# Patient Record
Sex: FEMALE | Race: WHITE | NOT HISPANIC OR LATINO | Employment: STUDENT | URBAN - METROPOLITAN AREA
[De-identification: names, ages, dates, MRNs, and addresses within clinical notes are randomized per-mention and may not be internally consistent; named-entity substitution may affect disease eponyms.]

---

## 2017-01-05 ENCOUNTER — TRANSCRIBE ORDERS (OUTPATIENT)
Dept: NUTRITION | Facility: HOSPITAL | Age: 22
End: 2017-01-05

## 2017-01-05 DIAGNOSIS — R19.7 DIARRHEA, UNSPECIFIED TYPE: Primary | ICD-10-CM

## 2017-01-11 ENCOUNTER — OFFICE VISIT (OUTPATIENT)
Dept: NUTRITION | Facility: HOSPITAL | Age: 22
End: 2017-01-11

## 2017-01-11 VITALS — HEIGHT: 63 IN | WEIGHT: 180 LBS | BODY MASS INDEX: 31.89 KG/M2

## 2017-01-11 PROCEDURE — 97802 MEDICAL NUTRITION INDIV IN: CPT

## 2017-01-11 NOTE — CONSULTS
Adult Outpatient Nutrition  Assessment/PES    Patient Name:  Patricia Merino  YOB: 1995  MRN: [id number]    Assessment Date:  1/11/2017          General Info       01/11/17 1250    Today's Session    Person(s) attending today's session Patient     Services Used Today? No    General Information    How well do you speak English? very well    Do you speak a language other than English at home? no    Are you able to read and write English? Yes    Lives With friend(s)   at [facility]    Last grade of school completed currently, sophomore in college    Is patient pregnant? no                Nutritional Info/Activity       01/11/17 1252    Nutritional Information    Have you had weight changes? No    What is your desired body weight? 63.5 kg (140 lb)    Have you tried to lose weight before? Yes    List programs tried, date, and success Weight Watchers, exercise    What is your motivation to lose weight? health    Food Allergies/Intolerances lactose    Use of Supplements vitamins   vit D, nasal B12    History of eating disorder? No    Do you have difficulty chewing food? No    Who Prepares Meals self;parent;other (see comments)   me/mom if home/dining bunch    List any food cravings/trigger foods you have none    How often during the day do you find yourself snacking? 1    Food Behaviors Stress eater    Do you use Food Assistance programs (WIC, food stamps, food bank)? no    Do you need information about Food Assistance programs? no    How many times do you drink milk per day? 0    How many times do you eat fruit per day? 1    How many times do you eat vegetables per day? --   1-2    How many times do you drink juice per day? --   0-1    How many times do you eat candy/chocolates per day? 0    How many times do you eat baked goods per day? --   0-1    How many times do you eat desserts per day? 0    How many times do you eat ice cream per day? 0    How many times do you eat snack foods  "per day? 1    How many diet sodas do you drink per day? 0    How many regular sodas do you drink per day? 0    How many times do you eat ethnic food per day? 0    How many times do you drink alcohol per day? --   0-1    How many times do you have caffeine per day? 1    How many servings of artificial sweetner do you have per day? 0    How many meals do you eat each day? --   2-3    How many snacks do you eat each day? 1    What is the biggest challenge you have with your diet? Food causing negative symptoms   diarrhea    What type of support do you currently use to help you with your health issues? friends I work out with    Enter everything you can remember eating in the last 24 hours (1 day) homemade GF banana bread, egg salad sandwich on GF bread, rice cake, GF chicken nuggets w/ ketchup    Eating Environment    Eating environment Family;Social    Physical Activity    Are you currently involved in an activity/exercise program?  Yes   when at school not regular but I do exercise    Describe physical activity 2x/wk - elliptical and ab exercises    How many minutes do you spend on exercise each day? --   0-30 min 2x/wk    How would you rank exercise as an important health lifestyle practice? 6            Home Nutrition Report       01/11/17 1302    Home Nutrition Report    Diet Self modified   trying to get into FODMAPs            Estimated/Assessed Needs       01/11/17 1303    Calculation Measurements    Weight Used For Calculations 81.6 kg (180 lb)    Height Used for Calculations 1.6 m (5' 3\")    Estimated/Assessed Energy Needs    [facility]    Age 21    RMR (Camas-[facility]. Jeor Equation) 1560.6    Activity Factors ([facility] or)  Out of bed, ambulatory  1.3    Total estimated needs ([facility]. Jeor) 2025 kcal apprx per day for maintenance of present weight; this is not focus of today's session, but if pt wishes to lose weight, 1500 kcal/day apprx would be appropriate for 1# apprx weight " "loss/wk            Evaluation of Prescribed Nutrient/Fluid Intake       01/11/17 1305    Evaluation of Prescribed Nutrient/Fluid Intake    Nutrition Prescribed Other (comment)   FODMAPs            Labs/Tests/Procedures/Meds       01/11/17 1305    Labs/Tests/Procedures/Meds    Medication Review Reviewed, pertinent    Significant Vitals reviewed, pertinent            Problem/Interventions:        Problem 1       01/11/17 1306    Nutrition Diagnoses Problem 1    Problem 1 Altered GI Function    Etiology (related to) Medical Diagnosis    Gastrointestinal Diarrhea    Signs/Symptoms (evidenced by) Potential Information Deficit                    Intervention Goal       01/11/17 1306    Intervention Goal    General Reduce/improve symptoms    PO Continue positive trend    Weight No significant weight loss              Comments:  Pt was seen today for FODMAP nutritional counseling. Patient describes problems with diarrhea.  She stated she is no longer having diarrhea every day; is having diarrhea 2x/day instead of 5-6 x/day.  Pt complained of gas but now having only \"a little\" bloating. She has been trying to follow low FODMAPs with recommendations from Dr. Newsome and leonel on her phone.  She admitted there are some confusing conflicts between information given today and what is on leonel.     Wants to obtain information on how to best follow FODMAPs.    Instructional process includes the plate method, nutrition label, meal planning, portions, restaurant nutrition, food record keeping, and activity/exercise.    Materials provided include [facility] nutrition education folder for FODMAPs - elimination phase, sample  meal plan and sample menus, and supporting nutrition education materials.     Goals:  1. Follow FODMAPs.  2. Stay same weight or lose some (not focus of this consultation.      Patient was provided with RD's contact information. Follow up visit is scheduled 2/1, 12:45 pm via phone (pt will be back at [place] " [place].Thank you for this referral.    Electronically signed by:  Tomi Sun RD  01/11/17 1:55 PM

## 2017-01-11 NOTE — MR AVS SNAPSHOT
"  [facility]  [phone]                    Patricia Merino   2017 12:45 PM   Office Visit    Dept Phone:  [phone]   Encounter #:  [id number]    Provider:  Tomi Sun RD   Department:  [facility]                Your Full Care Plan              Your Updated Medication List      Notice  As of 2017  1:50 PM    You have not been prescribed any medications.            Instructions     None    Patient Instructions History      Upcoming Appointments     Visit Type Date Time Department    INITIAL RD VISIT, 60 MIN 2017 12:45 PM [facility]      MyChart Signup     [facility] allows you to send messages to your doctor, view your test results, renew your prescriptions, schedule appointments, and more. To sign up, go to [employer] and click on the Sign Up Now link in the New User? box. Enter your [employer] Activation Code exactly as it appears below along with the last four digits of your Social Security Number and your Date of Birth () to complete the sign-up process. If you do not sign up before the expiration date, you must request a new code.    [employer] Activation Code: [email or web address]  Expires: 2017  1:50 PM    If you have questions, you can email [facility] [email or web address] or call [phone] to talk to our [employer] staff. Remember, [employer] is NOT to be used for urgent needs. For medical emergencies, dial 911.               Other Info from Your Visit           Allergies     Not on File      Vital Signs     Height Weight Body Mass Index Smoking Status          160 cm (63\") (30 %, Z= -0.51)* 81.6 kg (180 lb) (94 %, Z= 1.59)* 31.89 kg/m2 (95 %, Z= 1.66)* Never Smoker      *Growth percentiles are based on CDC 2-20 Years data.        "

## 2017-02-01 ENCOUNTER — TELEPHONE (OUTPATIENT)
Dept: NUTRITION | Facility: HOSPITAL | Age: 22
End: 2017-02-01

## 2017-02-01 NOTE — PROGRESS NOTES
"As pt and RD agreed, pt was followed up today via phone, as pt has returned to [place], [place].  She stated she has been following low FODMAP regimen - elimination phase.  She stated she is not having as much bloating and has had \"a couple of times diarrhea but I think it is due to being anxious about classes here at school.\"  Pt feels that lactose and gluten containing oligosaccharides are an issue, but wants to pursue challenges in order to ascertain what FODMAP groups are causing GI distress. Pt and RD agreed to having materials mailed to her at school; she is to do challenge phase and contact RD about scheduling a follow up, either in person while home on Spring Break or via phone.  Materials were reviewed over the phone today and mailed. Pt will call RD regarding scheduling of follow up.  Thank you for this referral.   "

## 2017-02-08 ENCOUNTER — TELEPHONE (OUTPATIENT)
Dept: NUTRITION | Facility: HOSPITAL | Age: 22
End: 2017-02-08

## 2017-02-08 NOTE — PROGRESS NOTES
RD contacted pt to scheduled follow up appointment for FODMAPs challenge phase.  Pt stated she just received the papers in the mail and plans to start challenges. Pt asked to do phone follow up as she does not know her plans for her Spring break.  Phone follow up is scheduled 3/29, 12:45 pm - pt will call RD. Thank you.